# Patient Record
Sex: MALE | Race: BLACK OR AFRICAN AMERICAN | NOT HISPANIC OR LATINO | Employment: OTHER | URBAN - METROPOLITAN AREA
[De-identification: names, ages, dates, MRNs, and addresses within clinical notes are randomized per-mention and may not be internally consistent; named-entity substitution may affect disease eponyms.]

---

## 2017-03-20 ENCOUNTER — HOSPITAL ENCOUNTER (EMERGENCY)
Facility: HOSPITAL | Age: 27
Discharge: HOME | End: 2017-03-20
Attending: EMERGENCY MEDICINE
Payer: MEDICAID

## 2017-03-20 VITALS
DIASTOLIC BLOOD PRESSURE: 96 MMHG | OXYGEN SATURATION: 99 % | TEMPERATURE: 98 F | SYSTOLIC BLOOD PRESSURE: 150 MMHG | HEART RATE: 97 BPM | RESPIRATION RATE: 20 BRPM

## 2017-03-20 DIAGNOSIS — R22.0 FACIAL SWELLING: ICD-10-CM

## 2017-03-20 DIAGNOSIS — K02.9 DENTAL CARIES: Primary | ICD-10-CM

## 2017-03-20 DIAGNOSIS — K05.10 GINGIVITIS: ICD-10-CM

## 2017-03-20 LAB
ALBUMIN SERPL BCP-MCNC: 3.2 G/DL
ALP SERPL-CCNC: 69 U/L
ALT SERPL W/O P-5'-P-CCNC: 9 U/L
ANION GAP SERPL CALC-SCNC: 7 MMOL/L
AST SERPL-CCNC: 17 U/L
BASOPHILS # BLD AUTO: 0.03 K/UL
BASOPHILS NFR BLD: 0.5 %
BILIRUB SERPL-MCNC: 0.4 MG/DL
BUN SERPL-MCNC: 14 MG/DL
BUN SERPL-MCNC: 14 MG/DL (ref 6–30)
CALCIUM SERPL-MCNC: 8.2 MG/DL
CHLORIDE SERPL-SCNC: 104 MMOL/L (ref 95–110)
CHLORIDE SERPL-SCNC: 110 MMOL/L
CO2 SERPL-SCNC: 23 MMOL/L
CREAT SERPL-MCNC: 0.9 MG/DL
CREAT SERPL-MCNC: 1 MG/DL (ref 0.5–1.4)
DIFFERENTIAL METHOD: ABNORMAL
EOSINOPHIL # BLD AUTO: 0.1 K/UL
EOSINOPHIL NFR BLD: 1.4 %
ERYTHROCYTE [DISTWIDTH] IN BLOOD BY AUTOMATED COUNT: 13.8 %
EST. GFR  (AFRICAN AMERICAN): >60 ML/MIN/1.73 M^2
EST. GFR  (NON AFRICAN AMERICAN): >60 ML/MIN/1.73 M^2
GLUCOSE SERPL-MCNC: 75 MG/DL
GLUCOSE SERPL-MCNC: 84 MG/DL (ref 70–110)
HCT VFR BLD AUTO: 36.2 %
HCT VFR BLD CALC: 38 %PCV (ref 36–54)
HGB BLD-MCNC: 12.4 G/DL
LYMPHOCYTES # BLD AUTO: 1.8 K/UL
LYMPHOCYTES NFR BLD: 28.9 %
MCH RBC QN AUTO: 29.6 PG
MCHC RBC AUTO-ENTMCNC: 34.3 %
MCV RBC AUTO: 86 FL
MONOCYTES # BLD AUTO: 0.7 K/UL
MONOCYTES NFR BLD: 10.6 %
NEUTROPHILS # BLD AUTO: 3.6 K/UL
NEUTROPHILS NFR BLD: 58.4 %
PLATELET # BLD AUTO: 221 K/UL
PMV BLD AUTO: 11.9 FL
POC IONIZED CALCIUM: 1.13 MMOL/L (ref 1.06–1.42)
POC TCO2 (MEASURED): 24 MMOL/L (ref 23–29)
POTASSIUM BLD-SCNC: 3.8 MMOL/L (ref 3.5–5.1)
POTASSIUM SERPL-SCNC: 3.5 MMOL/L
PROT SERPL-MCNC: 7.1 G/DL
RBC # BLD AUTO: 4.19 M/UL
SAMPLE: NORMAL
SODIUM BLD-SCNC: 141 MMOL/L (ref 136–145)
SODIUM SERPL-SCNC: 140 MMOL/L
WBC # BLD AUTO: 6.22 K/UL

## 2017-03-20 PROCEDURE — 63600175 PHARM REV CODE 636 W HCPCS: Performed by: EMERGENCY MEDICINE

## 2017-03-20 PROCEDURE — 99285 EMERGENCY DEPT VISIT HI MDM: CPT | Mod: 25

## 2017-03-20 PROCEDURE — 96361 HYDRATE IV INFUSION ADD-ON: CPT

## 2017-03-20 PROCEDURE — 99285 EMERGENCY DEPT VISIT HI MDM: CPT | Mod: ,,, | Performed by: EMERGENCY MEDICINE

## 2017-03-20 PROCEDURE — 80053 COMPREHEN METABOLIC PANEL: CPT

## 2017-03-20 PROCEDURE — 96374 THER/PROPH/DIAG INJ IV PUSH: CPT | Mod: 59

## 2017-03-20 PROCEDURE — 25500020 PHARM REV CODE 255: Performed by: EMERGENCY MEDICINE

## 2017-03-20 PROCEDURE — 85025 COMPLETE CBC W/AUTO DIFF WBC: CPT

## 2017-03-20 PROCEDURE — 25000003 PHARM REV CODE 250: Performed by: EMERGENCY MEDICINE

## 2017-03-20 PROCEDURE — 96372 THER/PROPH/DIAG INJ SC/IM: CPT

## 2017-03-20 RX ORDER — DIPHENHYDRAMINE HYDROCHLORIDE 50 MG/ML
25 INJECTION INTRAMUSCULAR; INTRAVENOUS
Status: COMPLETED | OUTPATIENT
Start: 2017-03-20 | End: 2017-03-20

## 2017-03-20 RX ORDER — HALOPERIDOL 5 MG/ML
5 INJECTION INTRAMUSCULAR
Status: COMPLETED | OUTPATIENT
Start: 2017-03-20 | End: 2017-03-20

## 2017-03-20 RX ORDER — PENICILLIN V POTASSIUM 500 MG/1
500 TABLET, FILM COATED ORAL 4 TIMES DAILY
Qty: 28 TABLET | Refills: 0 | Status: SHIPPED | OUTPATIENT
Start: 2017-03-20 | End: 2017-03-27

## 2017-03-20 RX ORDER — LORAZEPAM 2 MG/ML
2 INJECTION INTRAMUSCULAR
Status: DISCONTINUED | OUTPATIENT
Start: 2017-03-20 | End: 2017-03-20

## 2017-03-20 RX ORDER — DIPHENHYDRAMINE HYDROCHLORIDE 50 MG/ML
12.5 INJECTION INTRAMUSCULAR; INTRAVENOUS
Status: DISCONTINUED | OUTPATIENT
Start: 2017-03-20 | End: 2017-03-20

## 2017-03-20 RX ORDER — HALOPERIDOL 5 MG/ML
INJECTION INTRAMUSCULAR
Status: DISCONTINUED
Start: 2017-03-20 | End: 2017-03-21 | Stop reason: HOSPADM

## 2017-03-20 RX ORDER — DROPERIDOL 2.5 MG/ML
5 INJECTION, SOLUTION INTRAMUSCULAR; INTRAVENOUS ONCE
Status: DISCONTINUED | OUTPATIENT
Start: 2017-03-20 | End: 2017-03-20

## 2017-03-20 RX ORDER — LORAZEPAM 2 MG/ML
1 INJECTION INTRAMUSCULAR
Status: COMPLETED | OUTPATIENT
Start: 2017-03-20 | End: 2017-03-20

## 2017-03-20 RX ADMIN — SODIUM CHLORIDE 1000 ML: 0.9 INJECTION, SOLUTION INTRAVENOUS at 05:03

## 2017-03-20 RX ADMIN — HALOPERIDOL LACTATE 5 MG: 5 INJECTION, SOLUTION INTRAMUSCULAR at 04:03

## 2017-03-20 RX ADMIN — IOHEXOL 75 ML: 350 INJECTION, SOLUTION INTRAVENOUS at 05:03

## 2017-03-20 RX ADMIN — HALOPERIDOL LACTATE 5 MG: 5 INJECTION, SOLUTION INTRAMUSCULAR at 02:03

## 2017-03-20 RX ADMIN — LORAZEPAM 1 MG: 2 INJECTION INTRAMUSCULAR; INTRAVENOUS at 12:03

## 2017-03-20 RX ADMIN — DIPHENHYDRAMINE HYDROCHLORIDE 25 MG: 50 INJECTION, SOLUTION INTRAMUSCULAR; INTRAVENOUS at 04:03

## 2017-03-20 NOTE — ED PROVIDER NOTES
Encounter Date: 3/20/2017       History     Chief Complaint   Patient presents with    Abscess     under chin, special need, non verbal, wheelchair bound     Review of patient's allergies indicates:  Allergies not on file  HPI Comments: 25 y/o AA male with hx of developmental delay who presents to the ED with caretaker and father with reports of possible abscess x 1 week.. Patient is need of a dentist but has not been able to find one. Denies f/c, decreased appetite. They fell patient is otherwise at his baseline.    The history is provided by a parent and a caregiver.     History reviewed. No pertinent past medical history.  History reviewed. No pertinent surgical history.  History reviewed. No pertinent family history.  Social History   Substance Use Topics    Smoking status: Never Smoker    Smokeless tobacco: None    Alcohol use No     Review of Systems   Constitutional: Negative for fever.   HENT: Negative for sore throat.    Respiratory: Negative for shortness of breath.    Cardiovascular: Negative for chest pain.   Gastrointestinal: Negative for nausea.   Genitourinary: Negative for dysuria.   Musculoskeletal: Negative for back pain.   Skin: Negative for rash.   Neurological: Negative for weakness.   Hematological: Does not bruise/bleed easily.   All other systems reviewed and are negative.      Physical Exam   Initial Vitals   BP Pulse Resp Temp SpO2   -- 03/20/17 1118 03/20/17 1118 03/20/17 1118 03/20/17 1118    100 20 97.7 °F (36.5 °C) 100 %     Physical Exam    Nursing note and vitals reviewed.  Constitutional: He appears well-developed and well-nourished. No distress.   HENT:   Head: Normocephalic and atraumatic.       Mouth/Throat:       Eyes: EOM are normal. Pupils are equal, round, and reactive to light.   Neck: Normal range of motion. Neck supple.   Cardiovascular: Normal rate, regular rhythm, normal heart sounds and intact distal pulses. Exam reveals no gallop and no friction rub.    No murmur  heard.  Pulmonary/Chest: Breath sounds normal. No stridor. No respiratory distress. He has no wheezes. He has no rhonchi. He has no rales. He exhibits no tenderness.   Abdominal: Soft. Bowel sounds are normal. He exhibits no distension and no mass. There is no tenderness. There is no rebound and no guarding.   Musculoskeletal: Normal range of motion.   Neurological: He is alert.   Baseline developmental delay but moving upper extremities. In wheelchair and not moving LEs.    Skin: Skin is warm and dry.   Psychiatric: He has a normal mood and affect. His behavior is normal. Judgment and thought content normal.         ED Course   Procedures  Labs Reviewed   CBC W/ AUTO DIFFERENTIAL - Abnormal; Notable for the following:        Result Value    RBC 4.19 (*)     Hemoglobin 12.4 (*)     Hematocrit 36.2 (*)     All other components within normal limits   COMPREHENSIVE METABOLIC PANEL - Abnormal; Notable for the following:     Calcium 8.2 (*)     Albumin 3.2 (*)     ALT 9 (*)     Anion Gap 7 (*)     All other components within normal limits   ISTAT PROCEDURE          X-Rays:   Independently Interpreted Readings:   Other Readings:  CT soft tissue neck: negative for an abscess or fluid collection.          APC / Resident Notes:   25 y/o AA male with hx of developmental delay who presents to the ED with caretaker and father with reports of possible abscess x 1 week. Diff dx includes deep infection vs abscess vs HSV/stomatitis vs dental caries. Will get cbc, cmp and CT soft tissue neck to further evaluate for deep tissue infection. Dispo pending results.     12:56 PM 3/20/2017  Angel HERNÁNDEZ update note: patient with good response to haldol but when CT scan attempted patient awake and needing more medication for agitation. Given 25 mg benadryl without expected decreased agitation. Will give another haldol 5 mg and attempt CT scan again.    4:45 PM 3/20/2017  Angel HERNÁNDEZ update note:  CT soft tissue neck negative. Wbc wnl. Patient lost IV with transport. Will reassess once awake and give PO challenge. If wnl, will discharged with OP dental follow up for dental caries. Will send out on penicillin.    6:44 PM 3/20/2017  Angel Pedroza MD [facility]            Attending Attestation:   Physician Attestation Statement for Resident:  As the supervising MD   Physician Attestation Statement: I have personally seen and examined this patient.   I agree with the above history. -: Developmentally delayed young man brought in by his father for right-sided jaw swelling.   As the supervising MD I agree with the above PE.   -: This is a difficult exam as the patient is nonverbal and uncooperative.  There is asymmetric facial swelling on the right angle of the mandible.  There is also a fullness in the submandibular area, right greater than left.  This area feels warm to the touch.  No obvious erythema.   As the supervising MD I agree with the above treatment, course, plan, and disposition.   -: We attempted to obtain sedation with IM Ativan then IM Haldol in order to obtain an IV as well as CT scan to further evaluate the area.                    ED Course     Clinical Impression:   The primary encounter diagnosis was Dental caries. Diagnoses of Facial swelling and Gingivitis were also pertinent to this visit.          Ellen Schroeder MD  03/21/17 [id number]

## 2017-03-20 NOTE — DISCHARGE INSTRUCTIONS
Understanding Gingivitis  Gingivitis is a type of gum disease. It is an inflammation of the gums that causes redness and swelling. Its most often caused by infection from bacteria on the teeth. A severe infection can cause small painful sores on the gums, bad breath, and bleeding gums. If untreated, gingivitis can lead to periodontal disease. Over time, this can cause tooth loss.  What causes gingivitis?  Gingivitis is most often caused when plaque builds up on your teeth. Plaque is a sticky film made of bacteria and other substances that coats your teeth. Brushing and flossing helps remove plaque. If you dont brush and floss regularly, plaque can build up and lead to gingivitis.  You are more likely to have gingivitis if you:  · Dont brush or floss regularly  · Smoke or chew tobacco  · Are pregnant  · Have diabetes  · Use medicines such as birth control pills, steroids, drugs used to treat epilepsy, or cancer drugs  · Have family members who tend to get gingivitis  Signs and symptoms  You may have gingivitis if your gums have areas that:  · Are swollen  · Are bright red or dusky red  · Bleed easily  · Are sore  Treating gingivitis  · See your dentist. He or she may clean your teeth and remove buildup on your teeth of plaque and tartar. Tartar is a mixture of plaque and minerals that forms into a hard substance.  · Use an antiseptic mouth rinse if your dentist tells you to.  · Take antibiotics and other medicines exactly as directed. Dont stop taking them when your symptoms go away.  · Make sure you brush at least twice a day, and use dental floss at least once a day. This will help remove plaque from your teeth.  · Eat a soft diet, if needed, to ease discomfort. Avoid food and beverages that may cause more discomfort in your gums. These include citrus juices such as orange juice and lemonade, and salty or spicy foods.  · Use acetaminophen or ibuprofen for pain or fever. If you have liver or kidney disease or  "ever had a stomach ulcer or gastrointestinaI bleeding, talk with your healthcare provider before using these medicines.  When to call the healthcare provider  Call your healthcare provider if you have:  · Pain that doesnt go away or gets worse  · Gums that have pulled away from your teeth  · A bad taste in your mouth that wont go away  · Teeth that become loose  · Inability to eat or drink because of mouth pain      Date Last Reviewed: 9/19/2015 © 2000-2016 [employer] [address] [place] [address]. All rights reserved. This information is not intended as a substitute for professional medical care. Always follow your healthcare professional's instructions.        Dental Cavity    A dental cavity is a pit or crater in the surface of a tooth. This exposes the sensitive inner layer of the tooth and causes pain. If the cavity isnt treated, it will get bigger. It may enter the pulp and cause an infection or abscess in the bone at the root end (apex) of the tooth. An infection in the tooth is a much more serious problem than a cavity. If the tooth gets infected, you will need a root canal or the entire tooth taken out (extraction).  The pain in your tooth may be made worse by eating sweets or drinking hot or cold beverages. It may spread from the tooth to your ear or the area of your jaw on the same side.  Home care  Follow these tips when caring for yourself at home:  · Avoid sweets and hot and cold foods and drinks. Your tooth may be sensitive to changes in temperature.  · If your tooth is chipped or cracked, or if there is a large open cavity, put oil of cloves directly on the tooth to relieve pain. You can buy oil of cloves at drugstores. Some pharmacies carry an over-the-counter "toothache kit." This contains a paste that you can put on the exposed tooth to make it less sensitive.  · Put a cold pack on your jaw over the sore area to help reduce pain.  · You may use over-the-counter " medicine to ease pain, unless another medicine was prescribed. If you have chronic liver or kidney disease, talk with your healthcare provider before using acetaminophen or ibuprofen. Also talk with your provider if youve had a stomach ulcer or GI bleeding.  · If you have signs of an infection, you will be given an antibiotic. Take it as directed.  Follow-up care  Follow up with your dentist, or as advised. Your pain may go away with the treatment given today. But only a dentist can fully look at and treat this problem to prevent further tooth damage.  Call 911  Call 911 if any of these occur:  · Difficulty swallowing or breathing  · Weakness or fainting  · Unusual drowsiness  · Headache or stiff neck  When to seek medical advice  Call your healthcare provider right away if any of these occur:  · Redness or swelling of the face  · Pain gets worse or spreads to your neck  · Fever of 100.5 °F (38°C) or higher, or as directed by your healthcare provider  · Pus drains from the tooth or gum  Date Last Reviewed: 10/1/2016  © 3675-0358 [employer]. [address] [place] [address]. All rights reserved. This information is not intended as a substitute for professional medical care. Always follow your healthcare professional's instructions.          Understanding Tooth Decay  Plaque is a sticky coating of bacteria and other substances that forms on your teeth and gums. It can cause 2 serious problems: tooth decay and gum disease. These problems damage the teeth and gums, and may even lead to tooth loss. When the mouth is well cared for, tooth decay and gum disease can be reversed in their early stages. Better yet, you can prevent these problems from starting by brushing and flossing daily and avoiding between meal snacking on foods high in sugar and starch.     Once tooth decay eats through the enamel, it spreads quickly through the softer dentin.       After tooth decay is removed, the hole is filled  with a hard material.      How tooth decay develops  Tooth decay happens when bacteria in plaque make acids that eat away at the tooth. Cavities (also called caries) are holes that form in the teeth. They are most common in places that are hard to reach with a toothbrush. This includes the grooves at the tops of the back teeth, and on the sides where the teeth touch. In late stages, tooth decay can be painful. It can also lead to tooth loss.  Treating tooth decay  Tooth decay can be treated to keep it from moving farther into the tooth. This is often done by filling cavities. First, any tooth decay is removed. This protects the tooth from more damage. Then, the cavity is filled with a hard material. This filling protects the damaged tooth and restores the tooth's surface. If the tooth is severely damaged by decay, other treatments are available.  Follow-up visits  Visit your dental team at least every 6 months for a checkup and cleaning. If youre being treated for tooth decay or gum disease, you may need more frequent visits. These visits will likely decrease as your mouth care efforts start to pay off. Keep flossing and brushing, and maintain a healthy diet. Follow any special instructions your dentist or dental hygienist gives you. And enjoy flashing your healthy smile!  Date Last Reviewed: 6/30/2015 © 2000-2016 [employer] [address] [place] [address], [place] [address]. All rights reserved. This information is not intended as a substitute for professional medical care. Always follow your healthcare professional's instructions.

## 2017-03-20 NOTE — ED AVS SNAPSHOT
[facility]  [address] [facility] [address]               Brittney Owens   3/20/2017 12:25 PM   ED    Description:  Male : 1990   Department:  [facility]           Your Care was Coordinated By:     Provider Role From To    Ellen Schroeder MD Attending Provider 17 1239 17 [phone]    Ellen Schroeder MD Attending Provider 17 1718 --    Elvis Pedroza MD Resident 17 1225 --    Kel Zavala MD ED Temporary Attending 17 153 --      Reason for Visit     Abscess           Diagnoses this Visit        Comments    Dental caries    -  Primary     Facial swelling         Gingivitis           ED Disposition     None           To Do List           Follow-up Information     Follow up with [facility].    Specialty:  Emergency Medicine    Why:  As needed, If symptoms worsen    Contact information:    [address] [facility] [id number] [phone]        Follow up with [facility]. Call in 2 days.    Specialty:  Dental General Practice    Why:  Call to schedule a follow up appointment.     Contact information:     [facility]  AMBULATORY CARE BUILDING  [facility] [id number] [phone]         These Medications        Disp Refills Start End    penicillin v potassium (VEETID) 500 MG tablet 28 tablet 0 3/20/2017 3/27/2017    Take 1 tablet (500 mg total) by mouth 4 (four) times daily. - Oral      [facility]     [facility] On Call Nurse Care Line -  Assistance  Registered nurses in the [facility] provide clinical advisement, health education, appointment booking, and other advisory services.  Call for this free service at [phone].             Medications           Message regarding Medications     Verify the changes and/or additions to your medication regime listed below are the same as discussed with your  clinician today.  If any of these changes or additions are incorrect, please notify your healthcare provider.        START taking these NEW medications        Refills    penicillin v potassium (VEETID) 500 MG tablet 0    Sig: Take 1 tablet (500 mg total) by mouth 4 (four) times daily.    Class: Print    Route: Oral      These medications were administered today        Dose Freq    sodium chloride 0.9% bolus 1,000 mL 1,000 mL ED 1 Time    Sig: Inject 1,000 mLs into the vein ED 1 Time.    Class: Normal    Route: Intravenous    lorazepam injection 1 mg 1 mg ED 1 Time    Sig: Inject 0.5 mLs (1 mg total) into the muscle ED 1 Time.    Class: Normal    Route: Intramuscular    haloperidol lactate (HALDOL) 5 mg/mL injection      Notes to Pharmacy: Created by cabinet override    haloperidol lactate injection 5 mg 5 mg ED 1 Time    Sig: Inject 1 mL (5 mg total) into the muscle ED 1 Time.    Class: Normal    Route: Intramuscular    haloperidol lactate injection 5 mg 5 mg ED 1 Time    Sig: Inject 1 mL (5 mg total) into the muscle ED 1 Time.    Class: Normal    Route: Intramuscular    diphenhydrAMINE injection 25 mg 25 mg ED 1 Time    Sig: Inject 0.5 mLs (25 mg total) into the vein ED 1 Time.    Class: Normal    Route: Intravenous    omnipaque 350 iohexol 75 mL 75 mL IMG once as needed    Sig: Inject 75 mLs into the vein ONCE PRN for contrast.    Class: Normal    Route: Intravenous           Verify that the below list of medications is an accurate representation of the medications you are currently taking.  If none reported, the list may be blank. If incorrect, please contact your healthcare provider. Carry this list with you in case of emergency.           Current Medications     penicillin v potassium (VEETID) 500 MG tablet Take 1 tablet (500 mg total) by mouth 4 (four) times daily.           Clinical Reference Information           Your Vitals Were     Pulse Temp Resp SpO2          100 97.7 °F (36.5 °C) (Axillary) 20 100%         Allergies as of 3/20/2017     No Known Allergies      Immunizations Administered on Date of Encounter - 3/20/2017     None      ED Micro, Lab, POCT     Start Ordered       Status Ordering Provider    03/20/17 1520 03/20/17 1520  ISTAT PROCEDURE  Once      Final result     03/20/17 1454 03/20/17 1454  ISTAT CHEM8  Once      Acknowledged     03/20/17 1238 03/20/17 1242  CBC auto differential  STAT      Final result     03/20/17 1238 03/20/17 1242  Comprehensive metabolic panel  STAT      Final result       ED Imaging Orders     Start Ordered       Status Ordering Provider    03/20/17 1243 03/20/17 1242  CT Soft Tissue Neck With Contrast  1 time imaging      Final result         Discharge Instructions         Understanding Gingivitis  Gingivitis is a type of gum disease. It is an inflammation of the gums that causes redness and swelling. Its most often caused by infection from bacteria on the teeth. A severe infection can cause small painful sores on the gums, bad breath, and bleeding gums. If untreated, gingivitis can lead to periodontal disease. Over time, this can cause tooth loss.  What causes gingivitis?  Gingivitis is most often caused when plaque builds up on your teeth. Plaque is a sticky film made of bacteria and other substances that coats your teeth. Brushing and flossing helps remove plaque. If you dont brush and floss regularly, plaque can build up and lead to gingivitis.  You are more likely to have gingivitis if you:  · Dont brush or floss regularly  · Smoke or chew tobacco  · Are pregnant  · Have diabetes  · Use medicines such as birth control pills, steroids, drugs used to treat epilepsy, or cancer drugs  · Have family members who tend to get gingivitis  Signs and symptoms  You may have gingivitis if your gums have areas that:  · Are swollen  · Are bright red or dusky red  · Bleed easily  · Are sore  Treating gingivitis  · See your dentist. He or she may clean your teeth and remove buildup on your  teeth of plaque and tartar. Tartar is a mixture of plaque and minerals that forms into a hard substance.  · Use an antiseptic mouth rinse if your dentist tells you to.  · Take antibiotics and other medicines exactly as directed. Dont stop taking them when your symptoms go away.  · Make sure you brush at least twice a day, and use dental floss at least once a day. This will help remove plaque from your teeth.  · Eat a soft diet, if needed, to ease discomfort. Avoid food and beverages that may cause more discomfort in your gums. These include citrus juices such as orange juice and lemonade, and salty or spicy foods.  · Use acetaminophen or ibuprofen for pain or fever. If you have liver or kidney disease or ever had a stomach ulcer or gastrointestinaI bleeding, talk with your healthcare provider before using these medicines.  When to call the healthcare provider  Call your healthcare provider if you have:  · Pain that doesnt go away or gets worse  · Gums that have pulled away from your teeth  · A bad taste in your mouth that wont go away  · Teeth that become loose  · Inability to eat or drink because of mouth pain      Date Last Reviewed: 9/19/2015  © 8349-9443 [employer]. [address] [place] [address], [place] [address]. All rights reserved. This information is not intended as a substitute for professional medical care. Always follow your healthcare professional's instructions.        Dental Cavity    A dental cavity is a pit or crater in the surface of a tooth. This exposes the sensitive inner layer of the tooth and causes pain. If the cavity isnt treated, it will get bigger. It may enter the pulp and cause an infection or abscess in the bone at the root end (apex) of the tooth. An infection in the tooth is a much more serious problem than a cavity. If the tooth gets infected, you will need a root canal or the entire tooth taken out (extraction).  The pain in your tooth may be made worse by eating sweets  "or drinking hot or cold beverages. It may spread from the tooth to your ear or the area of your jaw on the same side.  Home care  Follow these tips when caring for yourself at home:  · Avoid sweets and hot and cold foods and drinks. Your tooth may be sensitive to changes in temperature.  · If your tooth is chipped or cracked, or if there is a large open cavity, put oil of cloves directly on the tooth to relieve pain. You can buy oil of cloves at drugstores. Some pharmacies carry an over-the-counter "toothache kit." This contains a paste that you can put on the exposed tooth to make it less sensitive.  · Put a cold pack on your jaw over the sore area to help reduce pain.  · You may use over-the-counter medicine to ease pain, unless another medicine was prescribed. If you have chronic liver or kidney disease, talk with your healthcare provider before using acetaminophen or ibuprofen. Also talk with your provider if youve had a stomach ulcer or GI bleeding.  · If you have signs of an infection, you will be given an antibiotic. Take it as directed.  Follow-up care  Follow up with your dentist, or as advised. Your pain may go away with the treatment given today. But only a dentist can fully look at and treat this problem to prevent further tooth damage.  Call 911  Call 911 if any of these occur:  · Difficulty swallowing or breathing  · Weakness or fainting  · Unusual drowsiness  · Headache or stiff neck  When to seek medical advice  Call your healthcare provider right away if any of these occur:  · Redness or swelling of the face  · Pain gets worse or spreads to your neck  · Fever of 100.5 °F (38°C) or higher, or as directed by your healthcare provider  · Pus drains from the tooth or gum  Date Last Reviewed: 10/1/2016  © 1376-3731 [employer]. [address] [place] [address], [place] [address]. All rights reserved. This information is not intended as a substitute for professional medical care. Always follow your " healthcare professional's instructions.          Understanding Tooth Decay  Plaque is a sticky coating of bacteria and other substances that forms on your teeth and gums. It can cause 2 serious problems: tooth decay and gum disease. These problems damage the teeth and gums, and may even lead to tooth loss. When the mouth is well cared for, tooth decay and gum disease can be reversed in their early stages. Better yet, you can prevent these problems from starting by brushing and flossing daily and avoiding between meal snacking on foods high in sugar and starch.     Once tooth decay eats through the enamel, it spreads quickly through the softer dentin.       After tooth decay is removed, the hole is filled with a hard material.      How tooth decay develops  Tooth decay happens when bacteria in plaque make acids that eat away at the tooth. Cavities (also called caries) are holes that form in the teeth. They are most common in places that are hard to reach with a toothbrush. This includes the grooves at the tops of the back teeth, and on the sides where the teeth touch. In late stages, tooth decay can be painful. It can also lead to tooth loss.  Treating tooth decay  Tooth decay can be treated to keep it from moving farther into the tooth. This is often done by filling cavities. First, any tooth decay is removed. This protects the tooth from more damage. Then, the cavity is filled with a hard material. This filling protects the damaged tooth and restores the tooth's surface. If the tooth is severely damaged by decay, other treatments are available.  Follow-up visits  Visit your dental team at least every 6 months for a checkup and cleaning. If youre being treated for tooth decay or gum disease, you may need more frequent visits. These visits will likely decrease as your mouth care efforts start to pay off. Keep flossing and brushing, and maintain a healthy diet. Follow any special instructions your dentist or dental  hygienist gives you. And enjoy flashing your healthy smile!  Date Last Reviewed: 6/30/2015  © 9379-5450 [employer]. [address] [place] [address], [place] [address]. All rights reserved. This information is not intended as a substitute for professional medical care. Always follow your healthcare professional's instructions.          [facility] Sign-Up     Activating your [facility] account is as easy as 1-2-3!     1) Visit [email or web address], select Sign Up Now, enter this activation code and your date of birth, then select Next.  [email or web address]  Expires: 5/4/2017  6:52 PM      2) Create a username and password to use when you visit [facility] in the future and select a security question in case you lose your password and select Next.    3) Enter your e-mail address and click Sign Up!    Additional Information  If you have questions, please e-mail [email or web address] or call [phone] to talk to our [facility] staff. Remember, [facility] is NOT to be used for urgent needs. For medical emergencies, dial 911.          [facility] complies with applicable Federal civil rights laws and does not discriminate on the basis of race, color, national origin, age, disability, or sex.        Language Assistance Services     ATTENTION: Language assistance services are available, free of charge. Please call [phone].      ATENCIÓN: Si habla español, tiene a richmond disposición servicios gratuitos de asistencia lingüística. Llame al [phone].     CHÚ Ý: N?u b?n nói Ti?ng Vi?t, có các d?ch v? h? tr? ngôn ng? mi?n phí dành cho b?n. G?i s? [phone].

## 2018-02-02 DIAGNOSIS — R62.50 DEVELOPMENT DELAY: Primary | ICD-10-CM

## 2018-03-02 ENCOUNTER — CLINICAL SUPPORT (OUTPATIENT)
Dept: REHABILITATION | Facility: HOSPITAL | Age: 28
End: 2018-03-02
Payer: MEDICAID

## 2018-03-02 DIAGNOSIS — G80.9 CONGENITAL CEREBRAL PALSY: Primary | ICD-10-CM

## 2018-03-02 DIAGNOSIS — R62.50 DEVELOPMENTAL DELAY: ICD-10-CM

## 2018-03-02 PROCEDURE — 97165 OT EVAL LOW COMPLEX 30 MIN: CPT | Mod: PN

## 2018-03-02 NOTE — PROGRESS NOTES
Occupational Therapy Wheelchair Evaluation    Brittney Owens  MRN: [id number]  Provider:  Venkatesh OSULLIVAN-MIGUEL ANGEL    Diagnosis:  Congenital Cerebral Palsy,  Developmental Delay      Onset date: Birth  Date of service: 3/2/18    Orders: wheelchair eval    Subjective:  Patient goals: Safe and efficient use of a wheelchair in home and community. Prevention of skin breakdown,.  Chief Complaint   Patient presents with    OT Initial Evaluation      Review of patient's allergies indicates:  No Known Allergies    Precautions: universal   Social Hx: Lives with mom and dad     DME: rolling walker, wheelchair  Home access: lift chair into house    Past treatment includes: No recent formal therapy for Brittney. He is taken care by aids on a daily bases who provided his care.  His father reports that Brittney participates in  pool exercise on Sundays and attends a therapy clinic for exercise 1 x a week to maintain his flexibility.    Pain: Pt does not express any pain related behaviors per his father, none noted during this evaluation    Dominant hand: ambidextrous    Occupation/hobbies/homemaking: likes mur[employer], zoo, just being outside    Driving status: n/a    Objective  Cognitive Exam  Oriented: non communicative, aware of his surroundings, turns his head away from undesired conversation/activity    Father reports he can do things but only on his own terms. He states that he will crawl around the house and get himself into a chair or bed.    Visual/perceptual:  N/t     Physical Exam:  PROM    UE Grossly WFL with shoulder flexion, abduction,IR/ER elbow contractures approx 45*, posturing of hands with thumb adduction and fingers flexed although PROM WFL    LE's tight  In ER /ABduction left greater then right hip, 30* flexion contractures in knees, full passive flexion of knees, ankles passive stretch to neutral  Strength: unable to formally assess due to poor response to verbal cues. Overall pt is able to push away from therapist  and draw knees and elbow away from stretched positioning- 4-4+/5  Coordination: Pt is not able to consistently propel w/c independently    Tone: slight increase in overall tone of UE's and LE's  Sensation: unable to test    Posture:   Trunk: scoliosis with concavity toward left side, throwing hips off in supported sitting .  Head- midline slightly forward    Skin integrity: intact no issues      Pt. Is in wheelchair 8 hours a day. Brittney Owens is able to perform positional changes not formal pressure releases.    Tone:  Modified Elton Scale: 1+ - 1+Slight increase in muscle tone, manifested by a catch followed by minimal      resistance throughout the remainder (less then half)of  the range of motion(ROM)+Slight increase in muscle tone, manifested by a catch followed by minimal resistance throughout the remainder (less then half)of  the range of motion(ROM).    Balance:   Static Sit: pt is able to maintain upright position with attempts to push out of midline, pt also sits unsupported with hips/legs symmetric positions  Dynamic sit: pt is able to regain upright posture when placed out of midline  Static Stand:n/t   Dynamic stand: n/t    Teofilo  from [employer] performed seating measures in my presence.  Functional Status:    Functional Mobility:  Bed mobility: pt is able to change positions at will   Supine to sit: able to achieve when desired but mod/max assist otherwise  Sit to supine: able to   Transfers to bed: crawls into bed  Transfers to toilet: n/t - diapered  Car transfers: modified   Wheelchair mobility: minimal self propulsion    ADL's:  Feeding: can hold cup and drink  Grooming: dependent  Hygiene: dependent  UB Dressing: dependent  LB Dressing: dependent   Toileting:  Diapered   Bathing:able to get into tub-dependent with bathing task    IADL's: - dependent on caretakers and parents     TODAY'S TREATMENT  Assessment for w/c    ASSESSMENT:  OT diagnosis: Cerebral Palsey  Assessment  Brittney  Sb Owens is a 27 y.o. with a medical diagnosis of Cerebral Palsey   referred to occupational therapy for wheelchair postioning.     Patient has mobility limitation that significantly impairs safe, timely, consistent participation in one or more MRADL. Yes  A mobility assistive device will effectively improve ability to participate in or aid participation in MRADL's.  Yes  A cane or walker will provide patient the ability to safely and consistently perform MRADLs in a timely manner  no  The patient's home environment will support use of recommended mobility device. Yes   The patient has sufficient UE strength and coordination to effectively self propel a manual wheelchair for al MRADL's.  no  The patient has sufficient upper extremity strength, , transfer ability and trunk stability and cognition to safely operate a POV(scooter).  no  The patient demonstrates ability/potential ability to use recommended equipment. Yes  The patient is willing and motivated to use the recommended equipment. Yes    A manual wheelchair with the following parts recommended by Teofilo to achieve the following goals:   Improve mobility  Improve postural alignment  Decrease chance of injury;Improve safety  Improve patient's ability to go to exercise and outings  Provide trunk support.  Facilitate I in self care/home care.  Provide I in home.  Decrease pressure wounds.  Accessories needed to extend life expectancy of this product    PLAN:   Patient/caregiver understands and agrees with plan of care.  OT to work with vendor, Teofilo Islas from [employer] to obtain recommended wheelchair.

## 2021-12-04 ENCOUNTER — IMMUNIZATION (OUTPATIENT)
Dept: PRIMARY CARE CLINIC | Facility: CLINIC | Age: 31
End: 2021-12-04
Payer: MEDICAID

## 2021-12-04 DIAGNOSIS — Z23 NEED FOR VACCINATION: Primary | ICD-10-CM

## 2021-12-04 PROCEDURE — 0004A COVID-19, MRNA, LNP-S, PF, 30 MCG/0.3 ML DOSE VACCINE: CPT | Mod: CV19,PBBFAC | Performed by: INTERNAL MEDICINE

## 2023-08-15 ENCOUNTER — CLINICAL SUPPORT (OUTPATIENT)
Dept: REHABILITATION | Facility: HOSPITAL | Age: 33
End: 2023-08-15
Payer: MEDICAID

## 2023-08-15 DIAGNOSIS — Z78.9 IMPAIRED MOBILITY AND ACTIVITIES OF DAILY LIVING: ICD-10-CM

## 2023-08-15 DIAGNOSIS — Z74.09 IMPAIRED MOBILITY AND ACTIVITIES OF DAILY LIVING: ICD-10-CM

## 2023-08-15 PROCEDURE — 97161 PT EVAL LOW COMPLEX 20 MIN: CPT | Mod: PN | Performed by: PHYSICAL THERAPIST

## 2023-08-15 NOTE — PROGRESS NOTES
See plan of care for wheelchair evaluation     Freya Childers, PT, DPT,   Board-Certified Clinical Specialist in Neurologic Physical Therapy   Certified Brain Injury Specialist

## 2023-08-17 NOTE — PLAN OF CARE
[facility]  Physical Therapy  Functional Mobility and Wheelchair Assessment    Date: 8/15/2023   Name: Brittney Owens  Clinic Number: [id number]    Therapy Diagnosis:   Encounter Diagnosis   Name Primary?    Impaired mobility and activities of daily living      Physician: Keesha Blas MD    Physician Orders: PT Eval and Treat Wheelchair Evaluation    Medical Diagnosis from Referral: G80.9 (ICD-10-CM) - CP (cerebral palsy)  Evaluation Date: 8/15/2023  Authorization Period Expiration: 12/31/23  Plan of Care Expiration: 8/15/2023  Evaluation Only  Visit # / Visits authorized: 1 / 1    Time In: 0814  Time Out: 0900  Total Billable Time: 46 minutes    Precautions: Standard    Subjective   Date of onset: birth  History of current condition - Brittney was referred by Dr. Blas for a functional mobility and custom wheelchair assessment due to cerebral palsy and Developmental delay. Patient is non verbal but father and caregiver reports purposeful movement for household mobility and tranfers. Patient does not perform these tasks upon command. Patient is able to pull up to stand, but is non ambulatory. Patient is able foot propel wheelchair in the home. PMHx: Left hip surgery due to dislocation  Prior Therapy: no recent physical therapy reported    Medical History:   No past medical history on file.    Surgical History:   Brittney Owens  has no past surgical history on file.    Medications:   Brittney currently has no medications in their medication list.    Allergies:   Review of patient's allergies indicates:  No Known Allergies     Patient height: 5'  Patient weight: ~150 lbs   Is this a progressive disease? No  Any recent weight changes or trends? No  Relevant future surgeries: No  Cardio status: intact  Respiratory status: intact   Does this patient have an amputation: No   Orthotic use: No    HOME ENVIRONMENT  Living environment: single family home multi-level home. Has  chair lift to enter home  Social support: lives with their family   Hours without assistance: 0  Home is accessible to patient: yes, WC/handicap accessible  Storage of wheelchair: in home     COMMUNITY  Transportation: van  Does patient sit in wheelchair during transport? No   Self-?  No  Employment and/or School - specific requirements related to mobility needs: not applicable     COMMUNICATION   Verbal communication: non-communicative, can use gestures  Language - primary:  english  Language - secondary: n/a   Communication provided by family and caregiver    CURRENT SEATING / MOBILITY:   Current Mobility Device: ttwick mobility Catalyst 5  , model, serial number and specs listed below if available.   CAT5VX  Age of current device (years): 5 years  Purchased by current insurance  Current condition of mobility base: current equipment would cost more to repair to functional and safe status than new replacement : push handle bars are worn/ missing handle , brakes are worn/loose, left foot rest is broken, current height of base makes foot propulsion more difficult (only toes will contact the ground when patient is sitting back in chair.)  Current seating system: age related wear and tear: [employer] matrix cushion ([id number]), Jay3 back  Age of seating system, if different from base (years): 5 years  Describe posture in present seating system:  left hip rotation  Is the current mobility device meeting medical necessity? No Explain: current height of wheelchair base makes foot propulsion more difficult (only toes will contact the ground when patient is sitting back in chair).     Prior Level of Function: dependent for activities of daily living, dependent in wheelchair mobility. Able to pull to stand, able to crawl  Current Level of Function: caregiver and father report patient is propelling wheelchair with feet room to room in the home. Dependent with activities of daily living.  Able to pull to stand,  able to crawl    Pain:  Current 0/10, worst 0/10, best 0/10- no grimacing noted  Location:   not applicable     Pt's goals: Obtain manual wheelchair for independent mobility in home and community.   Caregiver goals and specific limitations that may affect care: not applicable      See face-sheet for patient contact and insurance information         Objective     MOBILITY / BALANCE  Sitting Balance Standing Balance Transfers Ambulation   Normal: Patient able to maintain steady balance without handhold support. Fair: Patient able to maintain balance with handhold support; may require occasional minimal assistance. set up- total assist, depends on transfer type and patient motivation to transfer. Patient can perform wheelchair<>bed transfers with modified independence unable to ambulate    Fall History:   Number of falls in last 6 months: 0  Number of near falls in last 6 months: 0 Transfer method: squat pivot, at times can require 1 person assist      Ability to complete Mobility-Related Activities of Daily Living (MRADL's) with CURRENT Mobility Device  Move room to room independent with increased time MRADL's Comments:  patient currently foot propels for household mobility, total assist for mobility outside of home. Patient can also crawl per caregiver report   Meal preparation total assist    Feeding total assist    Bathing total assist    Grooming total assist    Upper Extremity Dressing total assist    Lower Extremity Dressing total assist    Toileting total assist    Bowel Management: diapers   Bladder Management: diapers     Current Functional Mobility Equipment Skills     Cane/Crutches Does not meet mobility needs due to:, Safety concerns with physical ability, and Decreased / limitations in endurance & strength    Walker / Rollator Does not meet mobility needs due to:, Safety concerns with physical ability, and Decreased / limitations in endurance & strength   Manual Wheelchair - Meets needs for safe  Independent functional ambulation / mobility   Manual Wheelchair with Power Assist () Not applicable   Scooter Not applicable   Power Wheelchair: standard joystick Not applicable   Power Wheelchair: alternative controls Not applicable   Summary: least costly alternative for independent functional mobility was found to be: manual wheelchair     Functional Processing Skills for Wheeled Mobility        Processing skills are adequate for safe mobility: Yes  Patient is willing and motivated to use recommended mobility equipment: Yes  Patient is UNABLE to safely operate mobility equipment independently and requires dependent care mobility: No       PATIENT MEASUREMENTS (inches)    1 31 seat to top of head    2 Right-22  Left-21.5 seat to shoulder left and right    3 Right-16.5  Left- 16.5 seat to axilla left and right    4 right -7  Left- 8 seat to 90 degree elbow left and right    5 right- 18.5  Left- 18 seat depth    6 10.25 foot length left and right    7 Not applicable  head width    8 17.5 shoulder width    9 11.5 chest width    10 15.25 hip width    11 16.5 floor to seat left    12 16.5 floor to seat right   Comments: not applicable         SENSATION and SKIN ISSUES    Sensation: intact, appears intact Location(s) of sensation impairment: not applicable    Pressure Relief Methods: lean side to side to offload (without risk of falling) and stand up (without risk of falling) Effective pressure relief method(s) above can be performed consistently throughout the day: Yes   But patient's cognition limits consistent performance   Skin Issues/Skin Integrity - Current skin issues:  No, intact         History of skin issues:   No   History of skin flap surgeries:   No   PAIN  0/10- no grimacing noted   How does pain interfere with mobility and/or MRADLs? Na        MAT EVALUATION    Neuro-Muscular Status (tone, reflexive, responses, etc.): Fluctuating      Pelvis     neutral       Right obliquity (left elevated);  tendency away from neutral       Right anterior; tendency away from neutral      Tonal Influence at Pelvis: Low Tone   Trunk     WFL       WFL- rib hump on right noted        Left - anterior; tendency away from neutral       Tonal Influence at Trunk: Low Tone   Head & Neck functional Good head control Tone/Movement of head and neck comments: normal mobility and tone noted      Hips     adducted; flexible - self correction: patient sits with crossed legs but can readjust        neutral   Tone/Movement of lower extremities:  Low Tone  Edema: none  Location: not applicable         Lower Extremity Passive Range of Motion     ROM   Hip Flexion WFLs     Hip Extension WFLs- slight flexion contracture (<30 deg)   Hip Abduction WFLs   Hip Adduction WFLs   Knee Extension WFLs- slight flexion contracture (<40 deg)   Knee Flexion    Ankle Dorsiflexion WFLs   Ankle Plantarflexion        Lower Extremity Strength  Right LE  Left LE    Hip flexion: 3/5 Hip flexion: 3/5   Hip extension:  Not tested  Hip extension: Not tested    Hip abduction: At least 2/5 Hip abduction: At least 2/5   Hip adduction: At least 2/5 Hip adduction At least 2/5   Knee extension: At least 2+/5 Knee extension: At least 2+/5   Knee flexion: At least 2/5 Knee flexion: At least 2/5   Ankle dorsiflexion: Not tested  Ankle dorsiflexion: Not tested    Ankle plantarflexion: Not tested  Ankle plantarflexion: Not tested    Patient does not follow commands for formal assessment, strength assessments made by observation      Upper Extremity Shoulder Posture:  Functional -bilateral  Elevation-not applicable   Depression -not applicable   Protraction -not applicable  Retraction -not applicable   Int-rotation -not applicable   Ext-rotation -not applicable   Subluxed -not applicable    Tone/Movement of upper extremities:   Normal    Edema: none  Location: not applicable          Wrist & Hand Handedness: unable to determine   Hand Limitations:  Limitations -does not fully  extend wrist and fingers, can be performed passively to neutral  Contractures -not applicable  Fisting -not applicable   Tremors -not applicable   Weak grasp -not applicable   Poor dexterity -bilateral  Hand movement non-functional -bilateral; can grasp and release  Paralysis -not applicable        Upper Extremity Range of Motion     ROM   Shoulder Flexion WFLs   Shoulder Abduction WFLs   Shoulder Adduction  WFLs   Elbow Flexion WFLs   Elbow Extension WFLs   Wrist Flexion   WFLs   Wrist Extension WFLs   Pinch Strength Not tested     Strength Right: fair to good  Left: fair to good     Upper Extremity Strength  (R) UE  (L) UE    Shoulder flexion: 3/5 Shoulder flexion: 3/5   Shoulder Abduction: 3/5 Shoulder Abduction: 3/5   Shoulder Adduction: 3/5 Shoulder Adduction: 3/5   Elbow flexion: 3/5 Elbow flexion: 3/5   Elbow extension: 3/5 Elbow extension: 3/5   Wrist flexion: 2/5 Wrist flexion: 2/5   Wrist extension: 2/5 Wrist extension: 2/5   Patient unable to follow commands for formal strength testing, strength per observation    Mobility Base Recommendations and Justification    Mobility Base Recommendation: Manual mobility base  Justification for decision: is not a safe, functional ambulator, limitation prevents accomplishing a MRADL(s) entirely, provide independent mobility, equipment is a lifetime medical need, walker or cane inadequate, and scooter/POV inadequate  Number of Hours per day in above selected mobility base: 4-8    Component Recommendations and Justification  Should include but not be limited to:   MANUAL MOBILITY     [] Standard manual wheelchair    Arm:  [] Right [] Left [] Bilateral  Foot:  [] Right [] Left [] Bilateral [] self-propels wheelchair   [] will use on regular basis   [] chair fits throughout home   [] willing and motivated to use   [] propels with assistance   [] dependent use    [] Standard kirstie-manual wheelchair    Arm:  [] Right [] Left [] Bilateral  Foot:  [] Right []  Left [] Bilateral [] lower seat height required to foot propel   [] short stature   [] self-propels wheelchair   [] will use on regular basis   [] chair fits throughout home   [] willing and motivated to use   [] propels with assistance  [] dependent use    [] Lightweight manual wheelchair    Arm:  [] Right [] Left [] Bilateral  Foot:  [] Right [] Left [] Bilateral  [] kirstie height required [] medical condition and weight of wheelchair affect ability to self propel standard manual wheelchair in the residence   [] can and does self-propel (marginal propulsion skills)   [] daily use   hours   [] chair fits throughout home   [] willing and motivated to use  [] lower seat height required to foot propel   [] short stature    [] High strength lightweight manual wheelchair ([employer] in [employer] Ultra 4)    Arm:  [] Right [] Left [] Bilateral  Foot:  [] Right [] Left [] Bilateral  [] kirstie height required [] medical condition and weight of wheelchair affect ability to self propel while engaging in frequent MRADL(s) that cannot be performed in a standard or lightweight manual wheelchair   [] daily use  hours   [] chair fits throughout home   [] willing and motivated to use  [] prevent repetitive use injuries   [] lower seat height required to foot propel   [] short stature   [x] Ultralightweight manual wheelchair    Arm:  [] Right [] Left [x] Bilateral  Foot:  [] Right [] Left [x] Bilateral   [x] kirstie height required   [] heavy duty   Front seat to floor 16 inches   Rear seat to floor 15 inches   Back height mid thoracic Back angle 90 degrees   Front angle 70 degrees [x] full-time manual wheelchair user   [x] Requires individualized fitting and optimal adjustments for multiple features that include adjustable axle configuration, fully adjustable center of gravity, wheel camber, seat and back angle, angle of seat slope, which cannot be accommodated by a  through  manual wheelchair   [] prevent repetitive use injuries    [x] daily use 4-6 hours   [] user has high activity patterns that frequently require them to go out into the community for the purpose of independently accomplishing high level MRADL activities. Examples of these might include a combination of; shopping, work, school, banking, childcare, independently loading and unloading from a vehicle etc.   [x] lower seat height required to foot propel   [x] short stature   [] heavy duty - weight over 250lbs    [x] Current chair is a  manufacture:[employer]  model: Catalyst 5 serial#____________________ age:5 yrs  [] First time  user (complete trial)    time and # of strokes to propel 30 feet: ________seconds _________strokes    time and # of strokes to propel 30 feet: ________seconds _________strokes   What was the result of the trial between the  and  manual wheelchair? Not applicable   What features of the [id number] w/c are needed as compared to the  base? Why? Adjustable axle, lighter weight, ability to add dump to seat for improved trunk stability during mobility  [x] adjustable seat and back angle changes the angle of seat slope of the frame to attain a gravity assisted position for efficient propulsion and proper weight distribution along the frame   [x] the front of the wheelchair will be configured higher than the back of the chair to allow gravity to assist the user with postural stability   [x] the center of the wheel will be positioned for stability, safety and efficient propulsion   [] adjustable axle allows for vertical, horizontal, camber and overall width changes throughout the wheels for adjustment of the client's exact needs and abilities.   [x] adjustable axle increases the stability and function of the chair allowing for adjustment of the center of gravity.   [x] accommodates the client's anatomical position in the chair maximizing independence in mobility and maneuverability in all environments.   [x] create a minimal fixed tilt-in  space to assist in positioning.   Describe users full-time manual wheelchair activity patterns: patient will foot propel in the home, needs a short height base. Patient moves frequently and would benefit from the angle adjustable seating system to improve stability while in chair   [] Power assist   Comments:   [] prevent repetitive use injuries  [] repetitive strain injury present in shoulder girdle   [] shoulder pain is (> or =) to 7/10 during manual propulsion  Current Pain ___/10   [] requires conservation of energy to participate in MRADL(s)   [] unable to propel up ramps or curbs using manual wheelchair   [] been  user greater than one year   [] user unwilling to use power wheelchair (reason):    [] less expensive option to power wheelchair   [] rim activated power assist - decreased strength    [] Heavy duty manual wheelchair      Arm:    Foot:   [] kirstie height required   [] Dependent base [] user exceeds 250lbs   [] non-functional ambulator   [] extreme spasticity   [] overactive movement  [] broken frame/hx of repeated repairs   [] able to self-propel in residence  [] lower seat to floor height required   [] unable to self-propel in residence    -Extra heavy duty manual wheelchair    Arm:    Foot:    [] kirstie height required   [] Dependent base [] user exceeds 300lbs   [] non-functional ambulator   [] able to self-propel in residence   [] lower seat to floor height required  [] unable to self-propel in residence    [] Manual wheelchair with tilt    (Manual Tilt-n-Space)  [] patient is dependent for transfers   [] patient requires frequent positioning for pressure relief   [] patient requires frequent positioning for poor/absent trunk control    [] Stroller Base [] infant/child   [] unable to propel manual wheelchair   [] allows for growth   [] non-functional ambulator  [] non-functional UE   [] independent mobility is not a goal at this time   MANUAL FRAME OPTIONS   Push handles   []  "extended   [] angle adjustable   [x] standard [x] caregiver access   [x] caregiver assist   [] allows hooking to enable increased ability to perform ADLs or maintain balance   [x] Angle Adjustable Back  [x] postural control   [] control of tone/spasticity   [] accommodation of range of motion  [] UE functional control   [x] accommodation for seating system    Rear wheel placement   [] std/fixed   [x] fully adjustable  [] amputee   [] camber  degree   [] removable rear wheel   [] non-removable rear wheel   Wheel size 22  Wheel style mag [] improved UE access to wheels   [] increase propulsion ability   [x] improved stability   [x] changing angle in space for improvement of postural stability   [x] remove for transport   [x] allow for seating system to fit on base   [] amputee placement   [] 1-arm drive access:    [] enable propulsion of manual wheelchair with one arm   [] amputee    Wheel rims/ Hand rims   [x] Standard   [] Specialized-    [x] provide ability to propel manual wheelchair   [] increase self-propulsion with hand weakness/decreased grasp   [] Spoke protector/guard  [] prevent hands from getting caught in spokes   Tires:   [] pneumatic   [] flat free inserts   [x] solid   Style: full poly  [x] decrease roll resistance   [x] increase shock absorbency   [] decrease pain from road shock   [] decrease spasms from road shock   [] prevent frequent flats   [x] decrease maintenance    Wheel Locks:   [x] push [] pull [] scissor  [x] lock wheels for transfers   [] lock wheels from rolling   [] Brake/wheel lock extension:  [] allow user to operate wheel locks due to decreased reach or strength   Mau housing: adjustable   Mau size: 5"  Style:   [] suspension fork  [x] maneuverability   [x] stability of wheelchair   [x] durability   [x] maintenance   [] angle adjustment for posture   [x] allow for feet to come under wheelchair base  [x] allows change in seat to floor height   [x] increase shock absorbency  [] " decrease pain from road shock   [] decrease spasms from road shock   [] Side guards  [] prevent clothing getting caught in wheel or becoming soiled   [] provide hip and pelvic stability  [] eliminates contact between body and wheels   [] limit hand contact with wheels   [x] Anti-tippers  [x] prevent wheelchair from tipping backward  [x] assist caregiver with curbs      CHAIR OPTIONS MANUAL & POWER   Armrests   [x] adjustable height [x] removable [] swing away[] fixed   [] flip back [] reclining   [x] full length pads [] desk [] tube arms   [] gel pads  [x] provide support with elbow at 90  [x] remove/flip back/swing away for transfers   [x] provide support and positioning of upper body   [x] allow to come closer to table top   [x] remove for access to tables   [] provide support for w/c tray   [x] change of height/angles for variable activities   [] Elbow support / Elbow stop  [] keep elbow positioned on arm pad   [] keep arms from falling off arm pad during tilt and/or recline    Upper Extremity Support   [] Arm trough   Style:   [] swivel mount [] fixed mount   [] posterior hand support   [] ½ tray   [] full tray -joystick cut out   Style:  [] decrease gravitational pull on shoulders   [] provide support to increase UE function   [] provide hand support in natural position   [] position flaccid UE   [] decrease subluxation   [] decrease edema   [] manage spasticity   [] provide midline positioning   [] provide work surface   [] placement for AAC/Computer/EADL   Hangers/ Legrests   [x] 70 degree   [] elevating   [] articulating   [x] swing away   [] fixed   [] lift off   [] heavy duty   [] adjustable knee angle   [] adjustable calf panel   [] longer extension tube  [x] provide LE support   [x] maintain placement of feet on footplate  [x] accommodate lower leg length   [] accommodate to hamstring tightness   [x] enable transfers   [] provide change in position for LE's   [] elevate legs during recline   [] decrease  edema   [x] durability    Foot support   [] footplate   [x] flip up  [] depth adjustable   [] angle adjustable  [] foot board/one piece  [x] provide foot support   [] accommodate to ankle ROM   [x] allow foot to go under wheelchair base   [x] enable transfers    [] Shoe holders  [] position foot   [] decrease / manage spasticity   [] control position of LE   [] stability   [] safety    [x] Ankle strap/heel loops  [x] support foot on foot support   [x] decrease extraneous movement   [] provide input to heel   [x] protect foot   [] Amputee adapter   Style:  Size:    [] Provide support for stump/residual extremity   [] Transportation tie-down [] to provide crash tested tie-down brackets    [] Crutch/cane taylor   [] O2 taylor   [] IV   [] Ventilator tray/mount  [] stabilize accessory on wheelchair   [x] Seat cushion - Axiom SP [] accommodate impaired sensation   [] decubitus ulcers present or history   [] unable to shift weight   [x] increase pressure distribution   [] prevent pelvic extension   [x] stabilize/promote pelvis alignment   [x] stabilize/promote femur alignment   [] accommodate obliquity   [] accommodate multiple deformity   [x] incontinent/accidents   [x] low maintenance   [x] seat zena   [x] fixed   [] removable [x] attach seat platform/cushion to wheelchair frame   [] Seat wedge  [] provide increased aggressiveness of seat shape to decrease sliding down in the seat   [] accommodate ROM    [] Cover replacement  [] protect back or seat cushion   [] incontinent/accidents   [] Solid seat / insert  [] support cushion to prevent hammocking   [] allows attachment of cushion to mobility base    [] Lateral pelvic/thigh/hip support (Guides)  [] decrease abduction   [] accommodate pelvis   [] position upper legs   [] accommodate spasticity   [] removable for transfers     [] Lateral pelvic/thigh supports zena  [] fixed   [] swing-away   [] removable   [] zena lateral pelvic/thigh supports   [] zena  lateral pelvic/thigh supports swing-away or removable for transfers   [] Medial thigh support ([place])  [] decrease adduction   [] accommodate ROM   [] alignment  [] remove for transfers     [] Medial thigh support zena   [] fixed   [] swing-away  [] removable   [] zena medial thigh supports  [] zena medial supports swing- away or removable for transfers   [x] Back- Jesus Alberto 3, mid thoracic height [x] provide posterior trunk support   [x] provide lumbar/sacral support   [x] support trunk in midline  [] provide lateral trunk support   [] accommodate or decrease tone   [] facilitate tone   [] accommodate deformity   [x] custom required off-the-shelf back support will not accommodate deformity    [x] Back zena  [] fixed   [x] removable [x] attach back rest/cushion to wheelchair frame   [] Lateral trunk supports    [] decrease lateral trunk leaning   [] accommodate asymmetry   [] contour for increased contact   [] safety   [] control of tone    [] Lateral trunk supports zena  [] fixed   [] swing-away   [] removable [] zena lateral trunk supports   [] zena lateral trunk supports swing away or removable for transfers   [] Anterior chest strap, vest  [] decrease forward movement of shoulder   [] decrease forward movement of trunk   [] safety/stability   [] added abdominal support   [] trunk alignment   [] assistance with shoulder control   [] decrease shoulder elevation    [] Headrest  [] provide posterior head support   [] provide posterior neck support   [] provide lateral head support   [] provide anterior head support   [] support during tilt and recline   [] improve feeding   [] improve respiration   [] placement of switches   [] safety   [] accommodate ROM   [] accommodate tone   [] improve visual orientation    [] Headrest mounting hardware  [] fixed   [] removable   [] flip down   [] swing-away laterals/switches [] mount headrest   [] zena headrest flip down or removable for transfers  [] mount  headrest swing-away laterals   [] mount switches    [] Neck Support  [] decrease neck rotation   [] decrease forward neck flexion    [x] Pelvic Positioner   [] std hip belt   [x] padded hip belt   [] dual pull hip belt   [] four point hip belt  [] stabilize tone   [x] decrease falling out of chair   [] prevent excessive extension   [] special pull angle to control rotation   [x] pad for protection over snow prominence   [x] promote comfort    [] Essential needs   bag/pouch  [] medicines [] special food [] orthotics [] clothing changes   [] diapers [] catheter/hygiene [] ostomy supplies              The above equipment has a life- long use expectancy. Growth and changes in medical and/or functional conditions would be the exceptions.      *This functional mobility and wheelchair assessment form has been adapted with permission from [employer] Pe[employer].     TREATMENT   No treatment provided today, evaluation only.      Home Exercises and Patient Education Provided    Education provided: Process of obtaining custom wheelchair, recommendations for adjustments made to new wheelchair as compared to current w/c    Written Home Exercises Provided: not applicable .  Brittney' father and caregiver demonstrated good  understanding of the education provided.     See EMR under not applicable for exercises provided not applicable.    Assessment  Why mobility device was selected; include why a lower level device is not appropriate:   Brittney is a 33 y.o. male referred to outpatient Physical Therapy with a medical diagnosis of Cerebral palsy for custom manual wheelchair evaluation. Brittney requires a custom size due to his height.  Brittney is a full-time wheelchair user and is fully independent in household mobility with the use of an optimally configured, ultra-lightweight wheelchair.  patient propels by his feet and due to a short stature will need a shorter floor to seat height. Patient also demonstrates frequent movements of the  trunk and lower extremities. This type of wheelchair frame allows for the seat angle adjustability to make the front of the seat higher than the back thus improving trunk stability and decreasing the risk from Brittney falling from the chair. This is wheelchair frame has an adjustable axle plate that will allow the rear wheel to be adjusted to a seat dump and center of gravity adjustment that is not available on a standard wheelchair. These features allow this frame to be set up for stability, safety and efficient propulsion for Brittney.Brittney also requires a supportive backrest that will allow freedom of upper extremity mobility such as the Jesus Alberto 3 back in mid thoracic height.a mixed density cushion such as the Axiom SP is recommended to improve pressure distribution as the patient is a full time wheelchair user but does not have the cognitive ability to perform frequent pressure reliefs.   With this frame Brittney will be able to complete all household mobility  independently including going to the kitchen for hydration and nutrition.      Patient has mobility limitation that significantly impairs safe, timely, consistent participation in one or more MRADL. Yes  A mobility assistive device will effectively improve ability to participate in or aid participation in MRADL's.  No   A cane or walker will provide patient the ability to safely and consistently perform MRADLs in a timely manner  No  The patient's home environment will support use of recommended mobility device. Yes  The patient has sufficient UE strength to effectively self propel a manual wheelchair for all MRADL's. Yes- but does not possess the coordination  The patient has sufficient upper extremity strength, , transfer ability and trunk stability to safely operate a POV (scooter). No  The additional benefits of a power wheelchair will more effectively enable MRADL's in home. No   The patient demonstrates ability/potential ability to use recommended  equipment. Yes  The patient is willing and motivated to use the recommended equipment. Yes      Pt prognosis is Good.     Plan of care discussed with patient/ family: Yes  Pt's spiritual, cultural and educational needs considered and patient is agreeable to the plan of care and goals as stated below:     Anticipated Barriers for therapy: communication, cognition     PT Medical Necessity is demonstrated by the following:    History  Co-morbidities and personal factors that may impact the plan of care Co-morbidities:   Not applicable     Personal Factors:   Cognition, communication     moderate   Examination  Body Structures and Functions, activity limitations and participation restrictions that may impact the plan of care Body Regions:   neck  back  lower extremities  upper extremities  trunk    Body Systems:    gross symmetry  ROM  strength  gross coordinated movement  balance  transfers  transitions  motor control  edema  skin integrity    Participation Restrictions:   Unable to ambulate  Decreased standing balance/ ability  Only able to foot propel wheelchair   Assist required for all activities of daily living    Activity limitations:   Learning and applying knowledge  Unable to fully assess due to decreased communication    General Tasks and Commands  undertaking a single task    Communication  communicating with/receiving spoken language  communicating with/receiving non-verbal language    Mobility  lifting and carrying objects  fine hand use (grasping/picking up)  walking  moving around using equipment (WC)    Self care  washing oneself (bathing, drying, washing hands)  caring for body parts (brushing teeth, shaving, grooming)  toileting  dressing  eating  drinking  looking after one's health    Domestic Life  shopping  cooking  doing house work (cleaning house, washing dishes, laundry)    Interactions/Relationships  basic interpersonal interactions  complex interpersonal interactions  relating with  [place]  school education  employment    Community and Social Life  community life  recreation and leisure         high   Clinical Presentation stable and uncomplicated low   Decision Making/ Complexity Score: low       Goals:  Short Term Goals: 1 day    Patient's family/ caregiver will verbalize knowledge and understanding of W/C evaluation process.   Patient's family/ caregiver will verbalize knowledge and understanding of purpose, function, and functional benefits of recommended W/C.      Plan   Plan of care Certification: 8/15/2023- evaluation only.    Freya SPENCER Liseth, PT  8/15/2023  License Number: [id number]   Therapist contact phone number: [phone]   As the evaluating therapist, I hereby attest that I have personally completed this evaluation and that I am not an employee of or working under contract to the (s) or the provider(s) of the durable medical equipment recommended in my evaluation. I further attest that I have not and will not receive remuneration of any kind from the (s) or the durable medical equipment provider(s) for the equipment I have recommended with this evaluation.     The following ATP was present and participated in this evaluation:   Teofilo Islas, ATP from . Wheelchair vendor.   Vendor phone: [phone]        I concur with the above findings and recommendations of the therapist:      Physician: Keesha Blas MD        Physician signature:___________________________________   date: ___________